# Patient Record
Sex: MALE | Race: WHITE | NOT HISPANIC OR LATINO | URBAN - NONMETROPOLITAN AREA
[De-identification: names, ages, dates, MRNs, and addresses within clinical notes are randomized per-mention and may not be internally consistent; named-entity substitution may affect disease eponyms.]

---

## 2022-08-29 ENCOUNTER — APPOINTMENT (RX ONLY)
Dept: URBAN - NONMETROPOLITAN AREA CLINIC 12 | Facility: CLINIC | Age: 82
Setting detail: DERMATOLOGY
End: 2022-08-29

## 2022-08-29 PROBLEM — C44.319 BASAL CELL CARCINOMA OF SKIN OF OTHER PARTS OF FACE: Status: ACTIVE | Noted: 2022-08-29

## 2022-08-29 PROCEDURE — 77427 RADIATION TX MANAGEMENT X5: CPT

## 2022-08-29 PROCEDURE — G6012 RADIATION TREATMENT DELIVERY: HCPCS

## 2022-08-29 PROCEDURE — ? ELECTRON BEAM THERAPY

## 2022-08-29 NOTE — PROCEDURE: ELECTRON BEAM THERAPY
Date Of Fraction 14: 08/29/2022
Location Override (Location Will Render As Ema Body Location If Left Blank): left frontal scalp
Date Of Fraction 5: 08/15/2022
Date Of Fraction 9: 08/22/2022
Radiation Units: cGy
Ebt Cpt Code (Please Add Code Details Below): 
Date Of Fraction 10: 08/23/2022
Date Of Fraction 1: 08/09/2022
Assessment: Appropriate reaction
Date Of Fraction 15: 08/30/2022
Date Of Fraction 6: 08/16/2022
Dimensions-X Axis In Cm: 0
Date Of Fraction 11: 08/24/2022
Date Of Fraction 2: 08/10/2022
Detail Level: Simple
Date Of Fraction 7: 08/18/2022
Total Planned Fractions: 27
Date Of Fraction 12: 08/25/2022
Date Of Fraction 3: 08/11/2022
Date Of Fraction 8: 08/19/2022
Daily Dosage Administered: [id number] [facility]
Total Rx Dosage: [address]
Date Of Fraction 13: 08/26/2022
Date Of Fraction 4: 08/12/2022
Send Cpt Codes To Pm?: Yes
Change Daily Dosage Administered Mid Treatment?: No
Mild, Moderate, Brisk Erythema Or Dry Desquamation Counseling: The patient was instructed in meticulous wound care and counseled on potential and expected side effects of treatment and reasonable expectations for the time to heal. They appeared to have all of their questions answered to their satisfaction. They were recommended to rinse the treatment site with mild soap and water (recommended Dove, Neutrogena or Cetaphil). After rinsing the treatment site twice a day they are to apply a pea-sized amount of Aquaphor healing ointment twice daily. Aquaphor samples were provided. The patient understands to call with any questions, concerns or difficulties. They were informed of the potentital for infection and counseled on common signs and symptoms of infection including skin redness extending outside of the treatment area, enlargement or skin breakdown, significant warmth, pain, fever or chills or sweats. They voiced an understanding to contact us immediately if any of these symptoms develop. Their follow up appointment was scheduled. They were also instructed to follow-up with dermatology for skin cancer surveillance.
Early, Moist Desquamation Counseling: The patient was instructed in meticulous wound care and counseled on potential and expected side effects of treatment and reasonable expectations for the time to heal. They appeared to have all of their questions answered to their satisfaction. They were recommended to cleanse the treatment site with dilute hydrogen peroxide and water (using 50:50 ratio). They were told how to apply the solution gently with a cotton ball twice daily. After cleaning, they were instructed to pat the area dry with a soft cloth and then apply a small amount of Silvadene 1% cream twice daily. They were told to return to the clinic in 7 days for re-evaluation. The patient understands to call with any questions, concerns or difficulties. They were informed of the potentital for infection and counseled on common signs and symptoms of infection including skin redness extending outside of the treatment area, enlargement or skin breakdown, significant warmth, pain, fever or chills or sweats. They voiced an understanding to contact us immediately if any of these symptoms develop. Their follow up appointment was scheduled. They were also instructed to follow-up with dermatology for skin cancer surveillance.

## 2022-08-31 ENCOUNTER — APPOINTMENT (RX ONLY)
Dept: URBAN - NONMETROPOLITAN AREA CLINIC 12 | Facility: CLINIC | Age: 82
Setting detail: DERMATOLOGY
End: 2022-08-31

## 2022-08-31 PROBLEM — C44.41 BASAL CELL CARCINOMA OF SKIN OF SCALP AND NECK: Status: ACTIVE | Noted: 2022-08-31

## 2022-08-31 PROBLEM — C44.319 BASAL CELL CARCINOMA OF SKIN OF OTHER PARTS OF FACE: Status: ACTIVE | Noted: 2022-08-31

## 2022-08-31 PROCEDURE — 77427 RADIATION TX MANAGEMENT X5: CPT

## 2022-08-31 PROCEDURE — G6012 RADIATION TREATMENT DELIVERY: HCPCS

## 2022-08-31 PROCEDURE — 77290 THER RAD SIMULAJ FIELD CPLX: CPT

## 2022-08-31 PROCEDURE — ? COMPLEX SIMULATION - REDUCED FIELD

## 2022-08-31 PROCEDURE — ? ELECTRON BEAM THERAPY

## 2022-08-31 NOTE — PROCEDURE: ELECTRON BEAM THERAPY
Date Of Fraction 14: 08/29/2022
Location Override (Location Will Render As Ema Body Location If Left Blank): left frontal scalp
Date Of Fraction 5: 08/15/2022
Date Of Fraction 9: 08/22/2022
Radiation Units: cGy
Date Of Fraction 19: 09/06/2022
Ebt Cpt Code (Please Add Code Details Below): 
Date Of Fraction 10: 08/23/2022
Date Of Fraction 1: 08/09/2022
Assessment: Appropriate reaction
Date Of Fraction 15: 08/30/2022
Date Of Fraction 6: 08/16/2022
Dimensions-X Axis In Cm: 0
Date Of Fraction 11: 08/24/2022
Date Of Fraction 2: 08/10/2022
Detail Level: Simple
Date Of Fraction 20: 09/07/2022
Date Of Fraction 16: 08/31/2022
Date Of Fraction 7: 08/18/2022
Total Planned Fractions: 27
Date Of Fraction 12: 08/25/2022
Date Of Fraction 3: 08/11/2022
Date Of Fraction 8: 08/19/2022
Daily Dosage Administered: [id number] [facility]
Total Rx Dosage: [address]
Date Of Fraction 13: 08/26/2022
Date Of Fraction 17: 09/01/2022
Date Of Fraction 4: 08/12/2022
Send Cpt Codes To Pm?: Yes
Date Of Fraction 18: 09/02/2022
Change Daily Dosage Administered Mid Treatment?: No
Mild, Moderate, Brisk Erythema Or Dry Desquamation Counseling: The patient was instructed in meticulous wound care and counseled on potential and expected side effects of treatment and reasonable expectations for the time to heal. They appeared to have all of their questions answered to their satisfaction. They were recommended to rinse the treatment site with mild soap and water (recommended Dove, Neutrogena or Cetaphil). After rinsing the treatment site twice a day they are to apply a pea-sized amount of Aquaphor healing ointment twice daily. Aquaphor samples were provided. The patient understands to call with any questions, concerns or difficulties. They were informed of the potentital for infection and counseled on common signs and symptoms of infection including skin redness extending outside of the treatment area, enlargement or skin breakdown, significant warmth, pain, fever or chills or sweats. They voiced an understanding to contact us immediately if any of these symptoms develop. Their follow up appointment was scheduled. They were also instructed to follow-up with dermatology for skin cancer surveillance.
Early, Moist Desquamation Counseling: The patient was instructed in meticulous wound care and counseled on potential and expected side effects of treatment and reasonable expectations for the time to heal. They appeared to have all of their questions answered to their satisfaction. They were recommended to cleanse the treatment site with dilute hydrogen peroxide and water (using 50:50 ratio). They were told how to apply the solution gently with a cotton ball twice daily. After cleaning, they were instructed to pat the area dry with a soft cloth and then apply a small amount of Silvadene 1% cream twice daily. They were told to return to the clinic in 7 days for re-evaluation. The patient understands to call with any questions, concerns or difficulties. They were informed of the potentital for infection and counseled on common signs and symptoms of infection including skin redness extending outside of the treatment area, enlargement or skin breakdown, significant warmth, pain, fever or chills or sweats. They voiced an understanding to contact us immediately if any of these symptoms develop. Their follow up appointment was scheduled. They were also instructed to follow-up with dermatology for skin cancer surveillance.

## 2022-08-31 NOTE — PROCEDURE: COMPLEX SIMULATION - REDUCED FIELD
Isodose: [address]
Closing Statement (Will Not Render If Left Blank): The patient tolerated the complex electron beam simulation well and will continue on radiotherapy using the modified field. The patient will return for a field verification simulation before radiation is delivered to confirm patient positioning and block fabrication parameters.
Position: supine
Energy In Mev: 6
Acetate Template Statement (Will Not Render If Left Blank): The acetate template will be used to fabricate a custom lead on skin shielding device to allow for lead on skin collimation. This type of shielding will best limit beam penumbra and define the field.
Intro Statement (Will Not Render If Left Blank): A new radiation electron beam field was designed to include the gross lesion (GTV) with additional margin that accounted for both potential subclinical microscopic extension (CTV), as well as for the beam characteristics of superficial electrons (PTV). This field took into account the changes in the volume of the tumor that have occurred during radiation therapy. Care was taken in designing the field near adjacent normal tissue structures. The target volume was drawn on the skin surface with a permanent marker and then the design with reference marks was carefully traced onto an acetate template. Digital photographs were taken.
Custom Bolus Type: custom mixed, molded, and shaped
Detail Level: Simple
Pathology: Use Selected EMA Impression
Bolus Thickness In Cm: 0.6

## 2022-09-07 ENCOUNTER — APPOINTMENT (RX ONLY)
Dept: URBAN - NONMETROPOLITAN AREA CLINIC 12 | Facility: CLINIC | Age: 82
Setting detail: DERMATOLOGY
End: 2022-09-07

## 2022-09-07 PROBLEM — C44.41 BASAL CELL CARCINOMA OF SKIN OF SCALP AND NECK: Status: ACTIVE | Noted: 2022-09-07

## 2022-09-07 PROCEDURE — 77280 THER RAD SIMULAJ FIELD SMPL: CPT

## 2022-09-07 PROCEDURE — ? SIMPLE SIMULATION - BLOCK CHECK

## 2022-09-07 NOTE — PROCEDURE: SIMPLE SIMULATION - BLOCK CHECK
Closing Statement (Will Not Render If Left Blank): Working with the physician, the therapist adjusted the machine gantry, table, and collimator and adjusted patient positioning to allow an appositional electron beam. SSD measurements were verified using the projected optical distance indicator light and room lasers. The field was positioned at 100cm SSD to the top of the bolus material. The machine parameters were recorded. The treatment light field was photographed projected onto the patient's skin. Further digital photographs were taken and will be used as a reference.
Detail Level: Simple
Custom Bolus Type: custom mixed, molded, and shaped
Bolus Thickness In Cm: 0.6
Position: supine

## 2022-09-08 ENCOUNTER — APPOINTMENT (RX ONLY)
Dept: URBAN - NONMETROPOLITAN AREA CLINIC 12 | Facility: CLINIC | Age: 82
Setting detail: DERMATOLOGY
End: 2022-09-08

## 2022-09-08 PROBLEM — C44.319 BASAL CELL CARCINOMA OF SKIN OF OTHER PARTS OF FACE: Status: ACTIVE | Noted: 2022-09-08

## 2022-09-08 PROCEDURE — G6012 RADIATION TREATMENT DELIVERY: HCPCS

## 2022-09-08 PROCEDURE — 77427 RADIATION TX MANAGEMENT X5: CPT

## 2022-09-08 PROCEDURE — ? ELECTRON BEAM THERAPY

## 2022-09-08 NOTE — PROCEDURE: ELECTRON BEAM THERAPY
Date Of Fraction 14: 08/29/2022
Location Override (Location Will Render As Ema Body Location If Left Blank): left frontal scalp
Date Of Fraction 5: 08/15/2022
Date Of Fraction 23: 09/12/2022
Date Of Fraction 9: 08/22/2022
Radiation Units: cGy
Date Of Fraction 19: 09/06/2022
Ebt Cpt Code (Please Add Code Details Below): 
Date Of Fraction 10: 08/23/2022
Date Of Fraction 24: 09/13/2022
Date Of Fraction 1: 08/09/2022
Assessment: Appropriate reaction
Date Of Fraction 15: 08/30/2022
Date Of Fraction 6: 08/16/2022
Dimensions-X Axis In Cm: 0
Date Of Fraction 11: 08/24/2022
Date Of Fraction 2: 08/10/2022
Detail Level: Simple
Date Of Fraction 20: 09/07/2022
Date Of Fraction 16: 08/31/2022
Date Of Fraction 21: 09/08/2022
Date Of Fraction 7: 08/18/2022
Date Of Fraction 25: 09/14/2022
Total Planned Fractions: [id number] [place]
Date Of Fraction 12: 08/25/2022
Date Of Fraction 3: 08/11/2022
Date Of Fraction 22: 09/09/2022
Date Of Fraction 8: 08/19/2022
Daily Dosage Administered: [id number] [facility]
Total Rx Dosage: [place]
Date Of Fraction 13: 08/26/2022
Date Of Fraction 17: 09/01/2022
Date Of Fraction 4: 08/12/2022
Send Cpt Codes To Pm?: Yes
Date Of Fraction 18: 09/02/2022
Change Daily Dosage Administered Mid Treatment?: No
Skin Reaction?: exaggerated skin reaction
Additional Notes: 2 week F/U on 10/10/22 at 11:00AM
How Was The Treatment Tolerated?: very well
Lesion Regression?: 100
Clinical Recommendations: Skin recommendations for mild, moderate, brisk erythema or dry desquamation
Early, Moist Desquamation Counseling: The patient was instructed in meticulous wound care and counseled on potential and expected side effects of treatment and reasonable expectations for the time to heal. They appeared to have all of their questions answered to their satisfaction. They were recommended to cleanse the treatment site with dilute hydrogen peroxide and water (using 50:50 ratio). They were told how to apply the solution gently with a cotton ball twice daily. After cleaning, they were instructed to pat the area dry with a soft cloth and then apply a small amount of Silvadene 1% cream twice daily. They were told to return to the clinic in 7 days for re-evaluation. The patient understands to call with any questions, concerns or difficulties. They were informed of the potentital for infection and counseled on common signs and symptoms of infection including skin redness extending outside of the treatment area, enlargement or skin breakdown, significant warmth, pain, fever or chills or sweats. They voiced an understanding to contact us immediately if any of these symptoms develop. Their follow up appointment was scheduled. They were also instructed to follow-up with dermatology for skin cancer surveillance.

## 2022-09-15 ENCOUNTER — APPOINTMENT (RX ONLY)
Dept: URBAN - NONMETROPOLITAN AREA CLINIC 12 | Facility: CLINIC | Age: 82
Setting detail: DERMATOLOGY
End: 2022-09-15

## 2022-09-15 PROBLEM — C44.319 BASAL CELL CARCINOMA OF SKIN OF OTHER PARTS OF FACE: Status: ACTIVE | Noted: 2022-09-15

## 2022-09-15 PROCEDURE — ? ELECTRON BEAM THERAPY

## 2022-09-15 PROCEDURE — 77427 RADIATION TX MANAGEMENT X5: CPT

## 2022-09-15 PROCEDURE — G6012 RADIATION TREATMENT DELIVERY: HCPCS

## 2022-09-15 NOTE — PROCEDURE: ELECTRON BEAM THERAPY
Date Of Fraction 14: 08/29/2022
Location Override (Location Will Render As Ema Body Location If Left Blank): left frontal scalp
Date Of Fraction 5: 08/15/2022
Date Of Fraction 23: 09/12/2022
Date Of Fraction 9: 08/22/2022
Radiation Units: cGy
Date Of Fraction 19: 09/06/2022
Ebt Cpt Code (Please Add Code Details Below): 
Date Of Fraction 10: 08/23/2022
Date Of Fraction 28: 09/19/2022
Date Of Fraction 24: 09/13/2022
Date Of Fraction 1: 08/09/2022
Date Of Fraction 29: 09/20/2022
Assessment: Appropriate reaction
Date Of Fraction 15: 08/30/2022
Date Of Fraction 6: 08/16/2022
Dimensions-X Axis In Cm: 0
Date Of Fraction 11: 08/24/2022
Date Of Fraction 2: 08/10/2022
Detail Level: Simple
Date Of Fraction 20: 09/07/2022
Date Of Fraction 30: 09/21/2022
Date Of Fraction 16: 08/31/2022
Date Of Fraction 21: 09/08/2022
Date Of Fraction 7: 08/18/2022
Date Of Fraction 25: 09/14/2022
Total Planned Fractions: [id number] [address] [place]
Date Of Fraction 26: 09/15/2022
Date Of Fraction 12: 08/25/2022
Date Of Fraction 3: 08/11/2022
Date Of Fraction 22: 09/09/2022
Date Of Fraction 8: 08/19/2022
Daily Dosage Administered: [id number] [facility]
Total Rx Dosage: [place]
Date Of Fraction 13: 08/26/2022
Date Of Fraction 17: 09/01/2022
Date Of Fraction 27: 09/16/2022
Date Of Fraction 4: 08/12/2022
Send Cpt Codes To Pm?: Yes
Date Of Fraction 18: 09/02/2022
Change Daily Dosage Administered Mid Treatment?: No
Early, Moist Desquamation Counseling: The patient was instructed in meticulous wound care and counseled on potential and expected side effects of treatment and reasonable expectations for the time to heal. They appeared to have all of their questions answered to their satisfaction. They were recommended to cleanse the treatment site with dilute hydrogen peroxide and water (using 50:50 ratio). They were told how to apply the solution gently with a cotton ball twice daily. After cleaning, they were instructed to pat the area dry with a soft cloth and then apply a small amount of Silvadene 1% cream twice daily. They were told to return to the clinic in 7 days for re-evaluation. The patient understands to call with any questions, concerns or difficulties. They were informed of the potentital for infection and counseled on common signs and symptoms of infection including skin redness extending outside of the treatment area, enlargement or skin breakdown, significant warmth, pain, fever or chills or sweats. They voiced an understanding to contact us immediately if any of these symptoms develop. Their follow up appointment was scheduled. They were also instructed to follow-up with dermatology for skin cancer surveillance.

## 2022-09-21 ENCOUNTER — APPOINTMENT (RX ONLY)
Dept: URBAN - NONMETROPOLITAN AREA CLINIC 12 | Facility: CLINIC | Age: 82
Setting detail: DERMATOLOGY
End: 2022-09-21

## 2022-09-22 ENCOUNTER — APPOINTMENT (RX ONLY)
Dept: URBAN - NONMETROPOLITAN AREA CLINIC 12 | Facility: CLINIC | Age: 82
Setting detail: DERMATOLOGY
End: 2022-09-22

## 2022-09-22 PROBLEM — C44.319 BASAL CELL CARCINOMA OF SKIN OF OTHER PARTS OF FACE: Status: ACTIVE | Noted: 2022-09-22

## 2022-09-22 PROCEDURE — 77427 RADIATION TX MANAGEMENT X5: CPT

## 2022-09-22 PROCEDURE — ? ELECTRON BEAM THERAPY

## 2022-09-22 PROCEDURE — G6012 RADIATION TREATMENT DELIVERY: HCPCS

## 2022-09-22 NOTE — PROCEDURE: ELECTRON BEAM THERAPY
Date Of Fraction 14: 08/29/2022
Location Override (Location Will Render As Ema Body Location If Left Blank): left frontal scalp
Date Of Fraction 5: 08/15/2022
Date Of Fraction 23: 09/12/2022
Date Of Fraction 9: 08/22/2022
Radiation Units: cGy
Date Of Fraction 19: 09/06/2022
Ebt Cpt Code (Please Add Code Details Below): 
Date Of Fraction 10: 08/23/2022
Date Of Fraction 28: 09/19/2022
Date Of Fraction 24: 09/13/2022
Date Of Fraction 1: 08/09/2022
Date Of Fraction 29: 09/20/2022
Assessment: Appropriate reaction
Date Of Fraction 15: 08/30/2022
Date Of Fraction 6: 08/16/2022
Dimensions-X Axis In Cm: 0
Date Of Fraction 11: 08/24/2022
Date Of Fraction 2: 08/10/2022
Detail Level: Simple
Date Of Fraction 20: 09/07/2022
Date Of Fraction 30: 09/21/2022
Date Of Fraction 16: 08/31/2022
Date Of Fraction 21: 09/08/2022
Date Of Fraction 7: 08/18/2022
Date Of Fraction 25: 09/14/2022
Total Planned Fractions: [id number] [place]
Date Of Fraction 26: 09/15/2022
Date Of Fraction 12: 08/25/2022
Date Of Fraction 3: 08/11/2022
Date Of Fraction 22: 09/09/2022
Date Of Fraction 8: 08/19/2022
Daily Dosage Administered: [id number] [facility]
Total Rx Dosage: [place]
Date Of Fraction 13: 08/26/2022
Date Of Fraction 17: 09/01/2022
Date Of Fraction 31: 09/22/2022
Date Of Fraction 27: 09/16/2022
Date Of Fraction 4: 08/12/2022
Send Cpt Codes To Pm?: Yes
Date Of Fraction 18: 09/02/2022
Change Daily Dosage Administered Mid Treatment?: No
Skin Reaction?: exaggerated skin reaction
Additional Notes: 2 week F/U on 10/10/22 at 11:00AM
How Was The Treatment Tolerated?: very well
Mild, Moderate, Brisk Erythema Or Dry Desquamation Counseling: The patient was instructed in meticulous wound care and counseled on potential and expected side effects of treatment and reasonable expectations for the time to heal. They appeared to have all of their questions answered to their satisfaction. They were recommended to rinse the treatment site with mild soap and water (recommended Dove, Neutrogena or Cetaphil). After rinsing the treatment site twice a day they are to apply a pea-sized amount of Aquaphor healing ointment twice daily. Aquaphor samples were provided. The patient understands to call with any questions, concerns or difficulties. They were informed of the potentital for infection and counseled on common signs and symptoms of infection including skin redness extending outside of the treatment area, enlargement or skin breakdown, significant warmth, pain, fever or chills or sweats. They voiced an understanding to contact us immediately if any of these symptoms develop. Their follow up appointment was scheduled. They were also instructed to follow-up with dermatology for skin cancer surveillance.
Lesion Regression?: 100
Clinical Recommendations: Skin recommendations for mild, moderate, brisk erythema or dry desquamation
Early, Moist Desquamation Counseling: The patient was instructed in meticulous wound care and counseled on potential and expected side effects of treatment and reasonable expectations for the time to heal. They appeared to have all of their questions answered to their satisfaction. They were recommended to cleanse the treatment site with dilute hydrogen peroxide and water (using 50:50 ratio). They were told how to apply the solution gently with a cotton ball twice daily. After cleaning, they were instructed to pat the area dry with a soft cloth and then apply a small amount of Silvadene 1% cream twice daily. They were told to return to the clinic in 7 days for re-evaluation. The patient understands to call with any questions, concerns or difficulties. They were informed of the potentital for infection and counseled on common signs and symptoms of infection including skin redness extending outside of the treatment area, enlargement or skin breakdown, significant warmth, pain, fever or chills or sweats. They voiced an understanding to contact us immediately if any of these symptoms develop. Their follow up appointment was scheduled. They were also instructed to follow-up with dermatology for skin cancer surveillance.

## 2022-10-10 ENCOUNTER — APPOINTMENT (RX ONLY)
Dept: URBAN - NONMETROPOLITAN AREA CLINIC 12 | Facility: CLINIC | Age: 82
Setting detail: DERMATOLOGY
End: 2022-10-10

## 2022-11-08 ENCOUNTER — APPOINTMENT (RX ONLY)
Dept: URBAN - NONMETROPOLITAN AREA CLINIC 12 | Facility: CLINIC | Age: 82
Setting detail: DERMATOLOGY
End: 2022-11-08

## 2022-11-08 DIAGNOSIS — B35.1 TINEA UNGUIUM: ICD-10-CM

## 2022-11-08 DIAGNOSIS — L57.0 ACTINIC KERATOSIS: ICD-10-CM | Status: WORSENING

## 2022-11-08 DIAGNOSIS — D22 MELANOCYTIC NEVI: ICD-10-CM

## 2022-11-08 DIAGNOSIS — L57.8 OTHER SKIN CHANGES DUE TO CHRONIC EXPOSURE TO NONIONIZING RADIATION: ICD-10-CM

## 2022-11-08 DIAGNOSIS — L82.1 OTHER SEBORRHEIC KERATOSIS: ICD-10-CM

## 2022-11-08 DIAGNOSIS — L81.4 OTHER MELANIN HYPERPIGMENTATION: ICD-10-CM

## 2022-11-08 DIAGNOSIS — D18.0 HEMANGIOMA: ICD-10-CM

## 2022-11-08 DIAGNOSIS — Z85.828 PERSONAL HISTORY OF OTHER MALIGNANT NEOPLASM OF SKIN: ICD-10-CM

## 2022-11-08 DIAGNOSIS — L21.8 OTHER SEBORRHEIC DERMATITIS: ICD-10-CM

## 2022-11-08 PROBLEM — D18.01 HEMANGIOMA OF SKIN AND SUBCUTANEOUS TISSUE: Status: ACTIVE | Noted: 2022-11-08

## 2022-11-08 PROBLEM — D22.5 MELANOCYTIC NEVI OF TRUNK: Status: ACTIVE | Noted: 2022-11-08

## 2022-11-08 PROCEDURE — ? LIQUID NITROGEN

## 2022-11-08 PROCEDURE — 17004 DESTROY PREMAL LESIONS 15/>: CPT

## 2022-11-08 PROCEDURE — ? SUNSCREEN RECOMMENDATIONS

## 2022-11-08 PROCEDURE — ? PRESCRIPTION

## 2022-11-08 PROCEDURE — 99213 OFFICE O/P EST LOW 20 MIN: CPT | Mod: 25

## 2022-11-08 PROCEDURE — ? COUNSELING

## 2022-11-08 RX ORDER — KETOCONAZOLE 20 MG/G
CREAM TOPICAL
Qty: 60 | Refills: 1 | Status: ERX | COMMUNITY
Start: 2022-11-08

## 2022-11-08 RX ADMIN — KETOCONAZOLE: 20 CREAM TOPICAL at 00:00

## 2022-11-08 ASSESSMENT — LOCATION DETAILED DESCRIPTION DERM
LOCATION DETAILED: LEFT CENTRAL FRONTAL SCALP
LOCATION DETAILED: LEFT SUPERIOR FRONTAL SCALP
LOCATION DETAILED: LEFT POSTERIOR SHOULDER
LOCATION DETAILED: RIGHT ANTERIOR PROXIMAL THIGH
LOCATION DETAILED: LEFT MEDIAL SUPERIOR CHEST
LOCATION DETAILED: LEFT DISTAL POSTERIOR THIGH
LOCATION DETAILED: RIGHT MEDIAL UPPER BACK
LOCATION DETAILED: RIGHT PROXIMAL POSTERIOR UPPER ARM
LOCATION DETAILED: RIGHT MEDIAL SUPERIOR CHEST
LOCATION DETAILED: RIGHT DISTAL DORSAL FOREARM
LOCATION DETAILED: LEFT INFERIOR MEDIAL MIDBACK
LOCATION DETAILED: LEFT MEDIAL MALAR CHEEK
LOCATION DETAILED: RIGHT MEDIAL INFERIOR CHEST
LOCATION DETAILED: RIGHT MEDIAL FRONTAL SCALP
LOCATION DETAILED: RIGHT ANTERIOR DISTAL UPPER ARM
LOCATION DETAILED: RIGHT SUPERIOR LATERAL UPPER BACK
LOCATION DETAILED: RIGHT CENTRAL MALAR CHEEK
LOCATION DETAILED: LEFT DISTAL POSTERIOR UPPER ARM
LOCATION DETAILED: RIGHT CENTRAL ZYGOMA
LOCATION DETAILED: LEFT SUPERIOR LATERAL BUCCAL CHEEK
LOCATION DETAILED: LEFT GREAT TOENAIL
LOCATION DETAILED: RIGHT SUPERIOR LATERAL BUCCAL CHEEK
LOCATION DETAILED: RIGHT LATERAL SUPERIOR CHEST
LOCATION DETAILED: RIGHT ANTERIOR DISTAL THIGH
LOCATION DETAILED: LEFT INFERIOR UPPER BACK
LOCATION DETAILED: RIGHT TRIANGULAR FOSSA
LOCATION DETAILED: RIGHT DISTAL POSTERIOR THIGH
LOCATION DETAILED: RIGHT VENTRAL PROXIMAL FOREARM
LOCATION DETAILED: LEFT INFERIOR CENTRAL MALAR CHEEK
LOCATION DETAILED: LEFT MID-UPPER BACK
LOCATION DETAILED: RIGHT PROXIMAL DORSAL FOREARM
LOCATION DETAILED: LEFT PROXIMAL DORSAL FOREARM
LOCATION DETAILED: RIGHT DISTAL RADIAL DORSAL FOREARM
LOCATION DETAILED: RIGHT MEDIAL MALAR CHEEK
LOCATION DETAILED: RIGHT SUPERIOR FOREHEAD
LOCATION DETAILED: LEFT SUPERIOR PARIETAL SCALP
LOCATION DETAILED: LEFT ANTERIOR EARLOBE
LOCATION DETAILED: LEFT SUPERIOR UPPER BACK
LOCATION DETAILED: LEFT MEDIAL FRONTAL SCALP
LOCATION DETAILED: RIGHT PROXIMAL POSTERIOR THIGH
LOCATION DETAILED: RIGHT GREAT TOENAIL
LOCATION DETAILED: LEFT CENTRAL PARIETAL SCALP
LOCATION DETAILED: LEFT SUPERIOR PREAURICULAR CHEEK
LOCATION DETAILED: LEFT 3RD TOENAIL
LOCATION DETAILED: RIGHT MID-UPPER BACK
LOCATION DETAILED: RIGHT CENTRAL FRONTAL SCALP
LOCATION DETAILED: MID-FRONTAL SCALP
LOCATION DETAILED: LEFT DISTAL DORSAL FOREARM

## 2022-11-08 ASSESSMENT — LOCATION ZONE DERM
LOCATION ZONE: TRUNK
LOCATION ZONE: SCALP
LOCATION ZONE: TOENAIL
LOCATION ZONE: EAR
LOCATION ZONE: FACE
LOCATION ZONE: ARM
LOCATION ZONE: LEG

## 2022-11-08 ASSESSMENT — LOCATION SIMPLE DESCRIPTION DERM
LOCATION SIMPLE: LEFT UPPER BACK
LOCATION SIMPLE: LEFT FOREARM
LOCATION SIMPLE: LEFT GREAT TOE
LOCATION SIMPLE: RIGHT ZYGOMA
LOCATION SIMPLE: LEFT EAR
LOCATION SIMPLE: RIGHT EAR
LOCATION SIMPLE: RIGHT THIGH
LOCATION SIMPLE: RIGHT GREAT TOE
LOCATION SIMPLE: RIGHT UPPER BACK
LOCATION SIMPLE: RIGHT FOREARM
LOCATION SIMPLE: RIGHT CHEEK
LOCATION SIMPLE: RIGHT POSTERIOR THIGH
LOCATION SIMPLE: LEFT SHOULDER
LOCATION SIMPLE: LEFT CHEEK
LOCATION SIMPLE: RIGHT FOREHEAD
LOCATION SIMPLE: LEFT LOWER BACK
LOCATION SIMPLE: RIGHT UPPER ARM
LOCATION SIMPLE: CHEST
LOCATION SIMPLE: SCALP
LOCATION SIMPLE: LEFT 3RD TOE
LOCATION SIMPLE: LEFT SCALP
LOCATION SIMPLE: RIGHT SCALP
LOCATION SIMPLE: LEFT UPPER ARM
LOCATION SIMPLE: LEFT POSTERIOR THIGH
LOCATION SIMPLE: ANTERIOR SCALP